# Patient Record
Sex: FEMALE | Race: WHITE | NOT HISPANIC OR LATINO | URBAN - METROPOLITAN AREA
[De-identification: names, ages, dates, MRNs, and addresses within clinical notes are randomized per-mention and may not be internally consistent; named-entity substitution may affect disease eponyms.]

---

## 2018-01-01 ENCOUNTER — INPATIENT (INPATIENT)
Facility: HOSPITAL | Age: 58
LOS: 0 days | End: 2018-03-12
Attending: INTERNAL MEDICINE | Admitting: INTERNAL MEDICINE
Payer: SELF-PAY

## 2018-01-01 VITALS
SYSTOLIC BLOOD PRESSURE: 131 MMHG | HEART RATE: 99 BPM | DIASTOLIC BLOOD PRESSURE: 106 MMHG | OXYGEN SATURATION: 97 % | RESPIRATION RATE: 21 BRPM | HEIGHT: 66 IN

## 2018-01-01 DIAGNOSIS — I62.9 NONTRAUMATIC INTRACRANIAL HEMORRHAGE, UNSPECIFIED: ICD-10-CM

## 2018-01-01 LAB
ALBUMIN SERPL ELPH-MCNC: 3 G/DL — LOW (ref 3.3–5)
ALBUMIN SERPL ELPH-MCNC: 3.1 G/DL — LOW (ref 3.3–5)
ALP SERPL-CCNC: 76 U/L — SIGNIFICANT CHANGE UP (ref 40–120)
ALP SERPL-CCNC: 79 U/L — SIGNIFICANT CHANGE UP (ref 40–120)
ALT FLD-CCNC: 67 U/L — SIGNIFICANT CHANGE UP (ref 12–78)
ALT FLD-CCNC: 82 U/L — HIGH (ref 12–78)
AMPHET UR-MCNC: NEGATIVE — SIGNIFICANT CHANGE UP
ANION GAP SERPL CALC-SCNC: 16 MMOL/L — SIGNIFICANT CHANGE UP (ref 5–17)
ANION GAP SERPL CALC-SCNC: 8 MMOL/L — SIGNIFICANT CHANGE UP (ref 5–17)
APTT BLD: 26.3 SEC — LOW (ref 27.5–37.4)
APTT BLD: 27.9 SEC — SIGNIFICANT CHANGE UP (ref 27.5–37.4)
AST SERPL-CCNC: 113 U/L — HIGH (ref 15–37)
AST SERPL-CCNC: 93 U/L — HIGH (ref 15–37)
BARBITURATES UR SCN-MCNC: NEGATIVE — SIGNIFICANT CHANGE UP
BASE EXCESS BLDA CALC-SCNC: -12.6 MMOL/L — LOW (ref -2–2)
BASE EXCESS BLDA CALC-SCNC: -6.4 MMOL/L — LOW (ref -2–2)
BASE EXCESS BLDA CALC-SCNC: -7.9 MMOL/L — LOW (ref -2–2)
BASOPHILS # BLD AUTO: 0.01 K/UL — SIGNIFICANT CHANGE UP (ref 0–0.2)
BASOPHILS # BLD AUTO: 0.04 K/UL — SIGNIFICANT CHANGE UP (ref 0–0.2)
BASOPHILS NFR BLD AUTO: 0.1 % — SIGNIFICANT CHANGE UP (ref 0–2)
BASOPHILS NFR BLD AUTO: 0.5 % — SIGNIFICANT CHANGE UP (ref 0–2)
BENZODIAZ UR-MCNC: NEGATIVE — SIGNIFICANT CHANGE UP
BILIRUB SERPL-MCNC: 0.2 MG/DL — SIGNIFICANT CHANGE UP (ref 0.2–1.2)
BILIRUB SERPL-MCNC: 0.3 MG/DL — SIGNIFICANT CHANGE UP (ref 0.2–1.2)
BLD GP AB SCN SERPL QL: SIGNIFICANT CHANGE UP
BLOOD GAS COMMENTS: SIGNIFICANT CHANGE UP
BLOOD GAS SOURCE: SIGNIFICANT CHANGE UP
BUN SERPL-MCNC: 19 MG/DL — SIGNIFICANT CHANGE UP (ref 7–23)
BUN SERPL-MCNC: 20 MG/DL — SIGNIFICANT CHANGE UP (ref 7–23)
CALCIUM SERPL-MCNC: 7.9 MG/DL — LOW (ref 8.5–10.1)
CALCIUM SERPL-MCNC: 8.3 MG/DL — LOW (ref 8.5–10.1)
CHLORIDE SERPL-SCNC: 109 MMOL/L — HIGH (ref 96–108)
CHLORIDE SERPL-SCNC: 122 MMOL/L — HIGH (ref 96–108)
CO2 SERPL-SCNC: 16 MMOL/L — LOW (ref 22–31)
CO2 SERPL-SCNC: 17 MMOL/L — LOW (ref 22–31)
COCAINE METAB.OTHER UR-MCNC: NEGATIVE — SIGNIFICANT CHANGE UP
CREAT SERPL-MCNC: 0.87 MG/DL — SIGNIFICANT CHANGE UP (ref 0.5–1.3)
CREAT SERPL-MCNC: 1.26 MG/DL — SIGNIFICANT CHANGE UP (ref 0.5–1.3)
EOSINOPHIL # BLD AUTO: 0.02 K/UL — SIGNIFICANT CHANGE UP (ref 0–0.5)
EOSINOPHIL # BLD AUTO: 0.13 K/UL — SIGNIFICANT CHANGE UP (ref 0–0.5)
EOSINOPHIL NFR BLD AUTO: 0.3 % — SIGNIFICANT CHANGE UP (ref 0–6)
EOSINOPHIL NFR BLD AUTO: 1.7 % — SIGNIFICANT CHANGE UP (ref 0–6)
GLUCOSE BLDC GLUCOMTR-MCNC: 111 MG/DL — HIGH (ref 70–99)
GLUCOSE BLDC GLUCOMTR-MCNC: 173 MG/DL — HIGH (ref 70–99)
GLUCOSE SERPL-MCNC: 244 MG/DL — HIGH (ref 70–99)
GLUCOSE SERPL-MCNC: 99 MG/DL — SIGNIFICANT CHANGE UP (ref 70–99)
HCO3 BLDA-SCNC: 14 MMOL/L — LOW (ref 21–29)
HCO3 BLDA-SCNC: 15 MMOL/L — LOW (ref 21–29)
HCO3 BLDA-SCNC: 17 MMOL/L — LOW (ref 21–29)
HCT VFR BLD CALC: 35.2 % — SIGNIFICANT CHANGE UP (ref 34.5–45)
HCT VFR BLD CALC: 36.1 % — SIGNIFICANT CHANGE UP (ref 34.5–45)
HGB BLD-MCNC: 11.8 G/DL — SIGNIFICANT CHANGE UP (ref 11.5–15.5)
HGB BLD-MCNC: 12.1 G/DL — SIGNIFICANT CHANGE UP (ref 11.5–15.5)
HOROWITZ INDEX BLDA+IHG-RTO: 100 — SIGNIFICANT CHANGE UP
HOROWITZ INDEX BLDA+IHG-RTO: 50 — SIGNIFICANT CHANGE UP
HOROWITZ INDEX BLDA+IHG-RTO: 50 — SIGNIFICANT CHANGE UP
IMM GRANULOCYTES NFR BLD AUTO: 0.4 % — SIGNIFICANT CHANGE UP (ref 0–1.5)
IMM GRANULOCYTES NFR BLD AUTO: 1.9 % — HIGH (ref 0–1.5)
INR BLD: 1.11 RATIO — SIGNIFICANT CHANGE UP (ref 0.88–1.16)
INR BLD: 1.12 RATIO — SIGNIFICANT CHANGE UP (ref 0.88–1.16)
LACTATE SERPL-SCNC: 1.8 MMOL/L — SIGNIFICANT CHANGE UP (ref 0.7–2)
LIDOCAIN IGE QN: 205 U/L — SIGNIFICANT CHANGE UP (ref 73–393)
LYMPHOCYTES # BLD AUTO: 1.03 K/UL — SIGNIFICANT CHANGE UP (ref 1–3.3)
LYMPHOCYTES # BLD AUTO: 15.4 % — SIGNIFICANT CHANGE UP (ref 13–44)
LYMPHOCYTES # BLD AUTO: 3.33 K/UL — HIGH (ref 1–3.3)
LYMPHOCYTES # BLD AUTO: 44.2 % — HIGH (ref 13–44)
MAGNESIUM SERPL-MCNC: 2.2 MG/DL — SIGNIFICANT CHANGE UP (ref 1.6–2.6)
MCHC RBC-ENTMCNC: 29.6 PG — SIGNIFICANT CHANGE UP (ref 27–34)
MCHC RBC-ENTMCNC: 29.7 PG — SIGNIFICANT CHANGE UP (ref 27–34)
MCHC RBC-ENTMCNC: 32.7 GM/DL — SIGNIFICANT CHANGE UP (ref 32–36)
MCHC RBC-ENTMCNC: 34.4 GM/DL — SIGNIFICANT CHANGE UP (ref 32–36)
MCV RBC AUTO: 86.3 FL — SIGNIFICANT CHANGE UP (ref 80–100)
MCV RBC AUTO: 90.7 FL — SIGNIFICANT CHANGE UP (ref 80–100)
METHADONE UR-MCNC: NEGATIVE — SIGNIFICANT CHANGE UP
MONOCYTES # BLD AUTO: 0.21 K/UL — SIGNIFICANT CHANGE UP (ref 0–0.9)
MONOCYTES # BLD AUTO: 0.37 K/UL — SIGNIFICANT CHANGE UP (ref 0–0.9)
MONOCYTES NFR BLD AUTO: 3.1 % — SIGNIFICANT CHANGE UP (ref 2–14)
MONOCYTES NFR BLD AUTO: 4.9 % — SIGNIFICANT CHANGE UP (ref 2–14)
NEUTROPHILS # BLD AUTO: 3.53 K/UL — SIGNIFICANT CHANGE UP (ref 1.8–7.4)
NEUTROPHILS # BLD AUTO: 5.39 K/UL — SIGNIFICANT CHANGE UP (ref 1.8–7.4)
NEUTROPHILS NFR BLD AUTO: 46.8 % — SIGNIFICANT CHANGE UP (ref 43–77)
NEUTROPHILS NFR BLD AUTO: 80.7 % — HIGH (ref 43–77)
NRBC # BLD: 0 /100 WBCS — SIGNIFICANT CHANGE UP (ref 0–0)
NRBC # BLD: 0 /100 WBCS — SIGNIFICANT CHANGE UP (ref 0–0)
OPIATES UR-MCNC: NEGATIVE — SIGNIFICANT CHANGE UP
PCO2 BLDA: 19 MMHG — LOW (ref 32–46)
PCO2 BLDA: 34 MMHG — SIGNIFICANT CHANGE UP (ref 32–46)
PCO2 BLDA: 39 MMHG — SIGNIFICANT CHANGE UP (ref 32–46)
PCP SPEC-MCNC: SIGNIFICANT CHANGE UP
PCP UR-MCNC: NEGATIVE — SIGNIFICANT CHANGE UP
PH BLD: 7.19 — CRITICAL LOW (ref 7.35–7.45)
PH BLD: 7.32 — LOW (ref 7.35–7.45)
PH BLD: 7.49 — HIGH (ref 7.35–7.45)
PHOSPHATE SERPL-MCNC: 1.1 MG/DL — LOW (ref 2.5–4.5)
PLATELET # BLD AUTO: 221 K/UL — SIGNIFICANT CHANGE UP (ref 150–400)
PLATELET # BLD AUTO: 269 K/UL — SIGNIFICANT CHANGE UP (ref 150–400)
PO2 BLDA: 123 MMHG — HIGH (ref 74–108)
PO2 BLDA: 291 MMHG — HIGH (ref 74–108)
PO2 BLDA: 84 MMHG — SIGNIFICANT CHANGE UP (ref 74–108)
POTASSIUM SERPL-MCNC: 3.5 MMOL/L — SIGNIFICANT CHANGE UP (ref 3.5–5.3)
POTASSIUM SERPL-MCNC: 3.7 MMOL/L — SIGNIFICANT CHANGE UP (ref 3.5–5.3)
POTASSIUM SERPL-SCNC: 3.5 MMOL/L — SIGNIFICANT CHANGE UP (ref 3.5–5.3)
POTASSIUM SERPL-SCNC: 3.7 MMOL/L — SIGNIFICANT CHANGE UP (ref 3.5–5.3)
PROT SERPL-MCNC: 6.4 GM/DL — SIGNIFICANT CHANGE UP (ref 6–8.3)
PROT SERPL-MCNC: 6.6 GM/DL — SIGNIFICANT CHANGE UP (ref 6–8.3)
PROTHROM AB SERPL-ACNC: 12.1 SEC — SIGNIFICANT CHANGE UP (ref 9.8–12.7)
PROTHROM AB SERPL-ACNC: 12.2 SEC — SIGNIFICANT CHANGE UP (ref 9.8–12.7)
RBC # BLD: 3.98 M/UL — SIGNIFICANT CHANGE UP (ref 3.8–5.2)
RBC # BLD: 4.08 M/UL — SIGNIFICANT CHANGE UP (ref 3.8–5.2)
RBC # FLD: 13 % — SIGNIFICANT CHANGE UP (ref 10.3–14.5)
RBC # FLD: 13.2 % — SIGNIFICANT CHANGE UP (ref 10.3–14.5)
SAO2 % BLDA: 96 % — SIGNIFICANT CHANGE UP (ref 92–96)
SAO2 % BLDA: 98 % — HIGH (ref 92–96)
SAO2 % BLDA: 99 % — HIGH (ref 92–96)
SODIUM SERPL-SCNC: 141 MMOL/L — SIGNIFICANT CHANGE UP (ref 135–145)
SODIUM SERPL-SCNC: 147 MMOL/L — HIGH (ref 135–145)
THC UR QL: NEGATIVE — SIGNIFICANT CHANGE UP
TROPONIN I SERPL-MCNC: 0.04 NG/ML — SIGNIFICANT CHANGE UP (ref 0.01–0.04)
WBC # BLD: 6.69 K/UL — SIGNIFICANT CHANGE UP (ref 3.8–10.5)
WBC # BLD: 7.54 K/UL — SIGNIFICANT CHANGE UP (ref 3.8–10.5)
WBC # FLD AUTO: 6.69 K/UL — SIGNIFICANT CHANGE UP (ref 3.8–10.5)
WBC # FLD AUTO: 7.54 K/UL — SIGNIFICANT CHANGE UP (ref 3.8–10.5)

## 2018-01-01 PROCEDURE — 70450 CT HEAD/BRAIN W/O DYE: CPT | Mod: 26

## 2018-01-01 PROCEDURE — 99497 ADVNCD CARE PLAN 30 MIN: CPT

## 2018-01-01 PROCEDURE — 99291 CRITICAL CARE FIRST HOUR: CPT

## 2018-01-01 PROCEDURE — 71045 X-RAY EXAM CHEST 1 VIEW: CPT | Mod: 26

## 2018-01-01 RX ORDER — MANNITOL
80 POWDER (GRAM) MISCELLANEOUS ONCE
Qty: 0 | Refills: 0 | Status: DISCONTINUED | OUTPATIENT
Start: 2018-01-01 | End: 2018-01-01

## 2018-01-01 RX ORDER — POTASSIUM PHOSPHATE, MONOBASIC POTASSIUM PHOSPHATE, DIBASIC 236; 224 MG/ML; MG/ML
30 INJECTION, SOLUTION INTRAVENOUS ONCE
Qty: 0 | Refills: 0 | Status: COMPLETED | OUTPATIENT
Start: 2018-01-01 | End: 2018-01-01

## 2018-01-01 RX ORDER — PANTOPRAZOLE SODIUM 20 MG/1
40 TABLET, DELAYED RELEASE ORAL DAILY
Qty: 0 | Refills: 0 | Status: DISCONTINUED | OUTPATIENT
Start: 2018-01-01 | End: 2018-01-01

## 2018-01-01 RX ORDER — MANNITOL
80 POWDER (GRAM) MISCELLANEOUS ONCE
Qty: 0 | Refills: 0 | Status: COMPLETED | OUTPATIENT
Start: 2018-01-01 | End: 2018-01-01

## 2018-01-01 RX ORDER — NOREPINEPHRINE BITARTRATE/D5W 8 MG/250ML
0.05 PLASTIC BAG, INJECTION (ML) INTRAVENOUS
Qty: 8 | Refills: 0 | Status: DISCONTINUED | OUTPATIENT
Start: 2018-01-01 | End: 2018-01-01

## 2018-01-01 RX ORDER — LEVETIRACETAM 250 MG/1
1000 TABLET, FILM COATED ORAL ONCE
Qty: 0 | Refills: 0 | Status: COMPLETED | OUTPATIENT
Start: 2018-01-01 | End: 2018-01-01

## 2018-01-01 RX ORDER — MORPHINE SULFATE 50 MG/1
2 CAPSULE, EXTENDED RELEASE ORAL
Qty: 0 | Refills: 0 | Status: DISCONTINUED | OUTPATIENT
Start: 2018-01-01 | End: 2018-01-01

## 2018-01-01 RX ORDER — CHLORHEXIDINE GLUCONATE 213 G/1000ML
15 SOLUTION TOPICAL
Qty: 0 | Refills: 0 | Status: DISCONTINUED | OUTPATIENT
Start: 2018-01-01 | End: 2018-01-01

## 2018-01-01 RX ORDER — NOREPINEPHRINE BITARTRATE/D5W 8 MG/250ML
0.02 PLASTIC BAG, INJECTION (ML) INTRAVENOUS
Qty: 8 | Refills: 0 | Status: DISCONTINUED | OUTPATIENT
Start: 2018-01-01 | End: 2018-01-01

## 2018-01-01 RX ORDER — CHLORHEXIDINE GLUCONATE 213 G/1000ML
1 SOLUTION TOPICAL DAILY
Qty: 0 | Refills: 0 | Status: DISCONTINUED | OUTPATIENT
Start: 2018-01-01 | End: 2018-01-01

## 2018-01-01 RX ADMIN — LEVETIRACETAM 400 MILLIGRAM(S): 250 TABLET, FILM COATED ORAL at 05:08

## 2018-01-01 RX ADMIN — Medication 7.5 MICROGRAM(S)/KG/MIN: at 22:22

## 2018-01-01 RX ADMIN — POTASSIUM PHOSPHATE, MONOBASIC POTASSIUM PHOSPHATE, DIBASIC 85 MILLIMOLE(S): 236; 224 INJECTION, SOLUTION INTRAVENOUS at 08:45

## 2018-01-01 RX ADMIN — CHLORHEXIDINE GLUCONATE 15 MILLILITER(S): 213 SOLUTION TOPICAL at 05:08

## 2018-01-01 RX ADMIN — MORPHINE SULFATE 2 MILLIGRAM(S): 50 CAPSULE, EXTENDED RELEASE ORAL at 14:50

## 2018-01-01 RX ADMIN — Medication 3 MICROGRAM(S)/KG/MIN: at 05:08

## 2018-01-01 RX ADMIN — Medication 400 GRAM(S): at 23:57

## 2018-01-01 RX ADMIN — CHLORHEXIDINE GLUCONATE 1 APPLICATION(S): 213 SOLUTION TOPICAL at 12:37

## 2018-01-01 RX ADMIN — MORPHINE SULFATE 2 MILLIGRAM(S): 50 CAPSULE, EXTENDED RELEASE ORAL at 14:35

## 2018-01-01 RX ADMIN — PANTOPRAZOLE SODIUM 40 MILLIGRAM(S): 20 TABLET, DELAYED RELEASE ORAL at 12:37

## 2018-03-11 NOTE — ED PROVIDER NOTE - PHYSICAL EXAMINATION
Gen: no signs of trauma  HEENT: Pupils 3mm, unreactive to light bilaterally; intubated  neck; no step offs;   CV: rrr; no m/g/r  lungs; equal breath sounds bilaterally;   abd; non distended  back; no trauma  ext; no edema;   neuro: no gag despite intubation; no spontaneous movements; no pupil movements; no spontaneous respirations; unresponsive to noxious stimuli

## 2018-03-11 NOTE — ED ADULT TRIAGE NOTE - CHIEF COMPLAINT QUOTE
As per EMS arrived tos scene As per EMS was eating, then noted vomiting, became unresponsive. States BLS arrived and pt had a faint pulse, then she went asystole. 1epi given via IO, intubated 7.0 and IO to right leg by EMS.

## 2018-03-11 NOTE — ED PROVIDER NOTE - MEDICAL DECISION MAKING DETAILS
A/P: s/p cardiac arrest 2/2 large intracranial hemorrhage with herniation; case discussed with neurosurgery (Dr. Sams) who reports given patient extensive hemorrhage, s/p cardiac arrest, unresponsiveness, her prognosis is extremely poor and refusing transfer because there is no neurosurgical intervention.  patient started on mannitol, Dr. lea (neurology consulted), will admit to ICU here. extensive discussion with family about patient's condition and prognosis and all questions answered

## 2018-03-11 NOTE — ED ADULT NURSE NOTE - OBJECTIVE STATEMENT
58 y/o female PMHx anxiety biba s/p cardiac arrest, as per ems patient was eating diiner at home and syncopized, HR 20 followed by asystole. 1 epinepherine given by ems. 7 et tube, 20 at lip line

## 2018-03-11 NOTE — ED ADULT NURSE NOTE - CHIEF COMPLAINT QUOTE
As per EMS was eating, then noted vomiting, became unresponsive. States BLS arrived and pt had a faint pulse, then she went asystole. 1epi given via IO, intubated 7.0 and IO to right leg by EMS.

## 2018-03-11 NOTE — ED PROVIDER NOTE - OBJECTIVE STATEMENT
Pertinent PMH/PSH/FHx/SHx and Review of Systems contained within:    58 y/o F w no known history presents after cardiac arrest; pt's boyfriend reports she was eating ice cream, soon afterwards had witnessed episode unresponsiveness; on EMS arrival, patient lost pulses, initial rhythm asystole, had ROSC after about 5 minutes of acls interventions; pt intubated in the field;   on ed arrival patient's w spontaneous cardiac activity, initial ekg significant for 1mm st elevation in avr and ST depressions v2-v6; discussed w cardiology (Dr. Callahan), initial plan to admit to CCU; patient's BP dropped to 60s/40s, started on low dose norepinephrine with improvement of bp;   pt subsequently had CT head significant for large L intracranial hemorrhage with intraventricular extension and herniation    PMH: none  meds; none  allergies; nkda  SH: no cig or drug use per family  FH: mother and father w MI

## 2018-03-12 NOTE — H&P ADULT - NSHPLABSRESULTS_GEN_ALL_CORE
EXAM:  CT BRAIN                            PROCEDURE DATE:  03/11/2018          INTERPRETATION:    Clinical Indication: Status post cardiac arrest    Comparison: None.        Technique: Noncontrast axial CT images of the head it were acquired.    Findings: There is a large intraparenchymal hemorrhage in the left   frontal lobe with subfalcine herniation. The midline shift up to 11 mm at   the level of frontal horns. There is subarachnoid hemorrhage in the   suprasellar cistern. Small amount of intraventricular hemorrhage is seen   in the lateral ventricles. There is complete effacement of the basal   cisterns with midline shift concerning for uncal herniation.. There is   near complete effacement of the cortical sulci indicating diffuse brain   edema. Gray-white differentiation is maintained. There is a small amount   of fluid collection in the both maxillary, ethmoid and sphenoid sinuses.   Visualized mastoid air cells are clear. There are flecks of air in the   nasopharynx (2-1). No osseous abnormality is seen.    Impression: Extensive left-sided intramural parenchymal hemorrhage,   subarachnoid hemorrhage and intraventricular hemorrhage with subfalcine   and uncal herniation.    Diffuse cerebral edema.    Flecks of air in the nasopharynx of unknown significance.    Findings were discussed with Dr. Hurley with read back at 11:07 PM.                CHARO CLARKE M.D., ATTENDING RADIOLOGIST  This document has been electronically signed. Mar 11 2018 11:36PM

## 2018-03-12 NOTE — H&P ADULT - ATTENDING COMMENTS
pt seen and examined at the bedside. pt with massive ICH, no pupillary reflex , no corneals, no gag, doesn't trigger the vent on my exam this am. condition d/w the sister and niece who are the decision maker as pt doesn't have any other family. they are leaning towards comfort care. will continue supportive treatment until family is ready.

## 2018-03-12 NOTE — CONSULT NOTE ADULT - ASSESSMENT
Historian:     Case d/w Dr. Hurley.  ER notes reviewed.  Case d/w Neurosurgery as per Dr. Hurley.    HPI:    GABINO JOHNSON  58 Y/O female w/ no known significant PMHx admitted s/p cardiac arrest. As per sister, pt was out eating w/ significant other when she suddenly became unresponsive. Upon EMS arrival, pt was pulseless, asystolic, ACLS performed and ROSC achieved after 5 min as per ED. Upon admission head CT determined presence of significant large L intracranial hemorrhage with intraventricular extension and herniation.  ED reports Neurosurgery (Dr. Sams) called who states that given patient extensive hemorrhage, s/p cardiac arrest, unresponsiveness, her prognosis is extremely poor and refusing transfer because there is no neurosurgical intervention. ICU called for further evaluation. (12 Mar 2018 04:03)      PAST MEDICAL & SURGICAL HISTORY:  57yFemale    MEDICATIONS  (STANDING):  chlorhexidine 0.12% Liquid 15 milliLiter(s) Swish and Spit two times a day  norepinephrine Infusion 0.02 MICROgram(s)/kG/Min (3 mL/Hr) IV Continuous <Continuous>  pantoprazole  Injectable 40 milliGRAM(s) IV Push daily  potassium phosphate IVPB 30 milliMole(s) IV Intermittent once    MEDICATIONS  (PRN):      Allergies    Allergy Status Unknown    Intolerances      FAMILY HISTORY:    Vital Signs Last 24 Hrs  T(C): 33.1 (12 Mar 2018 04:00), Max: 34.8 (11 Mar 2018 22:13)  T(F): 91.6 (12 Mar 2018 04:00), Max: 94.7 (11 Mar 2018 22:13)  HR: 75 (12 Mar 2018 08:00) (72 - 138)  BP: 98/72 (12 Mar 2018 08:00) (77/54 - 189/110)  BP(mean): 81 (12 Mar 2018 08:00) (61 - 105)  RR: 22 (12 Mar 2018 08:00) (15 - 22)  SpO2: 100% (12 Mar 2018 08:00) (95% - 100%)    NEUROLOGICAL EXAM:  To follow  HENT:  Normocephalic head; atraumatic head.  Neck supple.  ENT: normal looking.  Mental State:    Alert.  Fully oriented to person, place and date.  Coherent.  Speech clear and intact.  Cooperative.  Responds appropriately.    Cranial Nerves:  II-XII:   Pupils round and reactive to light and accommodation.  Extraocular movements full.  Visual fields full (no homonymous hemianopsia).  Visual acuity wnl.  Facial symmetry intact.  Tongue midline.  Motor Functions:  Moves all extremities.  No pronator drift of UE.  Claps hand well.  Hand  intact bilaterally.    Sensory Functions:   Intact to touch and pinprick to face and extremities.    Reflexes:  Deep tendon reflexes normoactive to biceps, knees and ankles.  Babinski absent (present).  Cerebellar Testing:    Finger to nose intact.  Nystagmus absent.  Neurovascular: Carotid auscultation full without bruits.      LABS:                        12.1   6.69  )-----------( 221      ( 12 Mar 2018 03:41 )             35.2     03-12    147<H>  |  122<H>  |  19  ----------------------------<  99  3.7   |  17<L>  |  0.87    Ca    7.9<L>      12 Mar 2018 03:41  Phos  1.1     03-12  Mg     2.2     03-12    TPro  6.4  /  Alb  3.1<L>  /  TBili  0.3  /  DBili  x   /  AST  113<H>  /  ALT  82<H>  /  AlkPhos  76  03-12    PT/INR - ( 12 Mar 2018 03:41 )   PT: 12.2 sec;   INR: 1.12 ratio         PTT - ( 12 Mar 2018 03:41 )  PTT:26.3 sec        RADIOLOGY & ADDITIONAL STUDIES:    POCT  Blood Glucose: 21:35 (03-11 @ 21:37)  Complete Blood Count + Automated Diff: STAT (03-11 @ 21:44)  Comprehensive Metabolic Panel: STAT (03-11 @ 21:44)  Lipase, Serum: STAT (03-11 @ 21:44)  Activated Partial Thromboplastin Time:  Start Date:11-Mar-2018. STAT (03-11 @ 21:44)  Prothrombin Time and INR, Plasma:  Start Date:11-Mar-2018. STAT (03-11 @ 21:44)  Troponin I, Serum: STAT (03-11 @ 21:44)  Type + Screen: Routine  Cancel Reason: Hemolyzed Redraw (03-11 @ 21:44)  ABO Rh Confirmatory Specimen: Routine  Addl Info: Conditional: ABO Rh Confirmatory Specimen (03-11 @ 21:44)  IV Insert:     Time/Priority:  STAT (03-11 @ 21:44)  Cardiac Monitor Bedside:     Time/Priority:  STAT (03-11 @ 21:44)  Xray Chest 1 View- PORTABLE-Urgent: Urgent   Indication: post cardiac arrest  Transport: Portable  Exam Completed  Provider's Contact #: [phone] (03-11 @ 21:44)  Nucleated RBC: 21:45 (03-11 @ 21:52)  Antibody Screen: 21:45  Cancel Reason: Hemolyzed Redraw (03-11 @ 21:52)  Mechanical Vent - PRVC Initial Setup: Routine  once (03-11 @ 21:56)  Mechanical Vent - PRVC Settings: Routine  <Continuous>  Ventilator Mode:  AC/CMV   Tidal Volume:  450  Respiratory Rate:  22   PEEP:  5   FiO2:  100  Additional Instructions:  May titrate the FIO2 to maintain the sat 95% (03-11 @ 21:56)  End Tidal CO2:   Frequency: Once  Modality: Ventilator Reason: S/P Cardiac Arrest (03-11 @ 22:00)  Standby Therapist Each 15 Minutes: Once (03-11 @ 22:01)  CT Head No Cont: Urgent   Indication: s/p cardiac arrest  Transport: Stretcher-Crib  Exam Completed  Provider's Contact #: [phone] (03-11 @ 22:01)  Blood Gas Profile - Arterial: STAT (03-11 @ 22:04)  CT Angio Chest w/ IV Cont: Urgent   Indication: r/o PE  Transport: Stretcher-Crib  Provider's Contact #: [phone] (03-11 @ 22:07)  norepinephrine Infusion: [Known as LEVOPHED]  8 milliGRAM(s) in sodium chloride 0.9% 250 milliLiter(s), infuse at 7.5 mL/Hr  Dose Rate: 0.05 MICROgram(s)/kG/Min  Administration Instructions: Peripheral/Central Line use  Provider's Contact #: [phone] (03-11 @ 22:16)  Indwelling Urethral Catheter:     Connect To:  Straight Drainage/Gravity    Indication:  Urine Output Monitoring in Critically Ill (03-11 @ 22:18)  Lactate, Blood: STAT (03-11 @ 22:26)  ABG Puncture - Respiratory: Once (03-11 @ 22:26)  mannitol 25% IVPB:   80 Gram(s), IV Intermittent, Once, infuse over 30 Minute(s), Stop After 1 Doses  Provider's Contact #: [phone] (03-11 @ 23:08)  Intra Hospital Transport: Once (03-11 @ 23:09)  Intra Hospital Transport: Once (03-11 @ 23:09)  ABG Puncture - Respiratory: Once (03-11 @ 23:30)  mannitol 20% IVPB:   80 Gram(s) in IV Solution 400 milliLiter(s), IV Intermittent, once, infuse over 60 Minute(s), Stop After 1 Doses  Indication: intracranial pressure  Administration Instructions: Concentration: 0.2 Gram(s)/milliLiter(s)  Provider's Contact #: [phone] (03-11 @ 23:46)  Type + Screen: Routine (03-11 @ 23:46)  ABO Rh Confirmatory Specimen: Routine  Addl Info: Conditional: ABO Rh Confirmatory Specimen (03-11 @ 23:46)  Admit to Inpatient Level of Care:     Service:  INTENSIVE CARE UNIT    Physician:  Karyn Castañeda    Additional Instructions:  Diagnosis: Intracranial hemorrhage  Isolation: None  Special Consideration: None (03-12 @ 00:10)  Antibody Screen: 00:02 (03-12 @ 00:13)  Admit from ED (03-12 @ 00:18)  levETIRAcetam  IVPB: [Ordered as KEPPRA IVPB]  1000 milliGRAM(s) in IV Solution 100 milliLiter(s), IV Intermittent, once, infuse over 15 Minute(s), Stop After 1 Doses  Administration Instructions: This is a Look-alike/Sound-alike Medication  Provider's Contact #: [phone] (03-12 @ 00:46)  Intermittent Pneumatic Compression:     Body Side:  Bilateral    Additional Instructions:  Apply device now and remove only for bathing and skin evaluation as per nursing protocol. (03-12 @ 00:52)  NO Sedation:     Frequency:  daily    Additional Instructions:  Daily Sedation Holiday (03-12 @ 00:52)  Height/Length:     Frequency:  on admission (03-12 @ 00:52)  Weight:     Frequency:  on admission (03-12 @ 00:52)  Weight:     Frequency:  daily (03-12 @ 00:52)  Vital Signs:     Frequency:  every 1 hour hour(s) hour(s)    Additional Instructions:  Assess BP, HR, RR, Temp. and SaO2 (03-12 @ 00:52)  Assess Pain:     Additional Instructions:  Per ICU protocol (03-12 @ 00:52)  Assess Neurological Status:     Type of Neuro Check:  General    Frequency:  every 4 hours (03-12 @ 00:52)  Notify Provider For:     Additional Instructions:  Decline or change in neurological status (03-12 @ 00:52)  Activity - May Dangle at Bedside:     Time/Priority:  Routine (03-12 @ 00:52)  Activity - Increase As Tolerated:     Time/Priority:  Routine (03-12 @ 00:52)  pantoprazole  Injectable: [Known as PROTONIX  Injectable]  40 milliGRAM(s), IV Push, daily  Provider's Contact #: [phone] (03-12 @ 00:52)  Admit to Inpatient Level of Care:     Service:  ICU    Physician:  Garry (03-12 @ 00:53)  Complete Blood Count + Automated Diff: AM Sched. Collection: 12-Mar-2018 05:00 (03-12 @ 00:54)  Comprehensive Metabolic Panel: AM Sched. Collection: 12-Mar-2018 05:00 (03-12 @ 00:54)  Magnesium, Serum: AM Sched. Collection: 12-Mar-2018 05:00 (03-12 @ 00:54)  Phosphorus Level, Serum: AM Sched. Collection: 12-Mar-2018 05:00 (03-12 @ 00:54)  Activated Partial Thromboplastin Time:  Start Date:12-Mar-2018. AM Sched. Collection:12-Mar-2018 05:00 (03-12 @ 00:54)  Prothrombin Time and INR, Plasma:  Start Date:12-Mar-2018. AM Sched. Collection:12-Mar-2018 05:00 (03-12 @ 00:54)  Suction Endotracheal: <Continuous> (03-12 @ 00:55)  Spontaneous Breathing Trials:   Frequency <Continuous>  Methods: Pressure Support  Additional Instructions:  5/5 (03-12 @ 00:55)  chlorhexidine 0.12% Liquid: [Known as Peridex]  15 milliLiter(s), Swish and Spit, two times a day  Indication: intubation  Administration Instructions: Rinse for 30 seconds then expectorate.  Dispose unused medication in BLACK bin.  Provider's Contact #: [phone] (03-12 @ 00:55)  POCT  Blood Glucose: 01:05 (03-12 @ 01:06)  Blood Gas Profile - Arterial: STAT (03-12 @ 01:39)  Intra Hospital Transport (03-12 @ 03:04)  Nucleated RBC: 03:15 (03-12 @ 03:41)  norepinephrine Infusion: [Ordered as LEVOPHED]  8 milliGRAM(s) in sodium chloride 0.9% 250 milliLiter(s), infuse at 3 mL/Hr  Dose Rate: 0.02 MICROgram(s)/kG/Min  Administration Instructions: Peripheral/Central Line use  Special Instructions: titrate for MAP > or = to 65 as per protocol  Provider's Contact #: [phone] (03-12 @ 03:59)  potassium phosphate IVPB:   30 milliMole(s) in sodium chloride 0.9% 500 milliLiter(s), IV Intermittent, once, infuse over 6 Hour(s), Stop After 1 Doses  Provider's Contact #: [phone] (03-12 @ 05:53)  Blood Gas Profile - Arterial: 08:00 (03-12 @ 07:20)    Assessment & Opinion:  Intracerebral Hemorrhage.  Coma. Severe Encephalopathy    Recommendations:  Brain MRI.  Carotid doppler.  Echocardiogram.  EEG.   DVT prophylaxis as ordered.  Medications: Case d/w Dr. Hurley.  ER notes reviewed.  Case d/w Neurosurgery as per Dr. Hurley.  HPI:    GABINO JOHNSON.  58 Y/O female w/ no known significant PMHx admitted s/p cardiac arrest. As per sister, pt was out eating w/ significant other when she suddenly became unresponsive. Upon EMS arrival, pt was pulseless, asystolic, ACLS performed and ROSC achieved after 5 min as per ED. Upon admission head CT determined presence of significant large L intracranial hemorrhage with intraventricular extension and herniation.  ED reports Neurosurgery (Dr. Sams) called who states that given patient extensive hemorrhage, s/p cardiac arrest, unresponsiveness, her prognosis is extremely poor and refusing transfer because there is no neurosurgical intervention. ICU called for further evaluation. (12 Mar 2018 04:03)    PAST MEDICAL & SURGICAL HISTORY:    MEDICATIONS  (STANDING):  chlorhexidine 0.12% Liquid 15 milliLiter(s) Swish and Spit two times a day  norepinephrine Infusion 0.02 MICROgram(s)/kG/Min (3 mL/Hr) IV Continuous <Continuous>  pantoprazole  Injectable 40 milliGRAM(s) IV Push daily  potassium phosphate IVPB 30 milliMole(s) IV Intermittent once    MEDICATIONS  (PRN):  Allergies: Allergy Status Unknown  Intolerances  FAMILY HISTORY:    Vital Signs Last 24 Hrs  T(C): 33.1 (12 Mar 2018 04:00), Max: 34.8 (11 Mar 2018 22:13)  T(F): 91.6 (12 Mar 2018 04:00), Max: 94.7 (11 Mar 2018 22:13)  HR: 75 (12 Mar 2018 08:00) (72 - 138)  BP: 98/72 (12 Mar 2018 08:00) (77/54 - 189/110)  BP(mean): 81 (12 Mar 2018 08:00) (61 - 105)  RR: 22 (12 Mar 2018 08:00) (15 - 22)  SpO2: 100% (12 Mar 2018 08:00) (95% - 100%)    NEUROLOGICAL EXAM:   Presently intubated (seen in ICU)  HENT:  Normocephalic head; atraumatic head.  Neck supple.    Mental State:   Deeply comatose.      Cranial Nerves:  Pupils round and slightly reactive to light.   Absent gag; corneals; absent oculocephalics.   Motor Functions:  Totally atonic.      Sensory Functions:   Absent to noxious stimulation.     Reflexes:  Absent superficial and Deep tendon reflexes.  Cerebellar Testing:    Unable to test.    Neurovascular: Carotid auscultation full without bruits.      LABS:                        12.1   6.69  )-----------( 221      ( 12 Mar 2018 03:41 )             35.2     03-12    147<H>  |  122<H>  |  19  ----------------------------<  99  3.7   |  17<L>  |  0.87    Ca    7.9<L>      12 Mar 2018 03:41  Phos  1.1     03-12  Mg     2.2     03-12    TPro  6.4  /  Alb  3.1<L>  /  TBili  0.3  /  DBili  x   /  AST  113<H>  /  ALT  82<H>  /  AlkPhos  76  03-12    PT/INR - ( 12 Mar 2018 03:41 )   PT: 12.2 sec;   INR: 1.12 ratio         PTT - ( 12 Mar 2018 03:41 )  PTT:26.3 sec    RADIOLOGY & ADDITIONAL STUDIES:    POCT  Blood Glucose: 21:35 (03-11 @ 21:37)  Complete Blood Count + Automated Diff: STAT (03-11 @ 21:44)  Comprehensive Metabolic Panel: STAT (03-11 @ 21:44)  Lipase, Serum: STAT (03-11 @ 21:44)  Activated Partial Thromboplastin Time:  Start Date:11-Mar-2018. STAT (03-11 @ 21:44)  Prothrombin Time and INR, Plasma:  Start Date:11-Mar-2018. STAT (03-11 @ 21:44)  Troponin I, Serum: STAT (03-11 @ 21:44)  Type + Screen: Routine  Cancel Reason: Hemolyzed Redraw (03-11 @ 21:44)  ABO Rh Confirmatory Specimen: Routine  Addl Info: Conditional: ABO Rh Confirmatory Specimen (03-11 @ 21:44)  IV Insert:     Time/Priority:  STAT (03-11 @ 21:44)  Cardiac Monitor Bedside:     Time/Priority:  STAT (03-11 @ 21:44)  Xray Chest 1 View- PORTABLE-Urgent: Urgent   Indication: post cardiac arrest  Transport: Portable  Exam Completed  Provider's Contact #: [phone] (03-11 @ 21:44)  Nucleated RBC: 21:45 (03-11 @ 21:52)  Antibody Screen: 21:45  Cancel Reason: Hemolyzed Redraw (03-11 @ 21:52)  Mechanical Vent - PRVC Initial Setup: Routine  once (03-11 @ 21:56)  Mechanical Vent - PRVC Settings: Routine  <Continuous>  Ventilator Mode:  AC/CMV   Tidal Volume:  450  Respiratory Rate:  22   PEEP:  5   FiO2:  100  Additional Instructions:  May titrate the FIO2 to maintain the sat 95% (03-11 @ 21:56)  End Tidal CO2:   Frequency: Once  Modality: Ventilator Reason: S/P Cardiac Arrest (03-11 @ 22:00)  Standby Therapist Each 15 Minutes: Once (03-11 @ 22:01)  CT Head No Cont: Urgent   Indication: s/p cardiac arrest  Transport: Stretcher-Crib  Exam Completed  Provider's Contact #: [phone] (03-11 @ 22:01)  Blood Gas Profile - Arterial: STAT (03-11 @ 22:04)  CT Angio Chest w/ IV Cont: Urgent   Indication: r/o PE  Transport: Stretcher-Crib  Provider's Contact #: [phone] (03-11 @ 22:07)  norepinephrine Infusion: [Known as LEVOPHED]  8 milliGRAM(s) in sodium chloride 0.9% 250 milliLiter(s), infuse at 7.5 mL/Hr  Dose Rate: 0.05 MICROgram(s)/kG/Min  Administration Instructions: Peripheral/Central Line use  Provider's Contact #: [phone] (03-11 @ 22:16)  Indwelling Urethral Catheter:     Connect To:  Straight Drainage/Gravity    Indication:  Urine Output Monitoring in Critically Ill (03-11 @ 22:18)  Lactate, Blood: STAT (03-11 @ 22:26)  ABG Puncture - Respiratory: Once (03-11 @ 22:26)  mannitol 25% IVPB:   80 Gram(s), IV Intermittent, Once, infuse over 30 Minute(s), Stop After 1 Doses  Provider's Contact #: [phone] (03-11 @ 23:08)  Intra Hospital Transport: Once (03-11 @ 23:09)  Intra Hospital Transport: Once (03-11 @ 23:09)  ABG Puncture - Respiratory: Once (03-11 @ 23:30)  mannitol 20% IVPB:   80 Gram(s) in IV Solution 400 milliLiter(s), IV Intermittent, once, infuse over 60 Minute(s), Stop After 1 Doses  Indication: intracranial pressure  Administration Instructions: Concentration: 0.2 Gram(s)/milliLiter(s)  Provider's Contact #: [phone] (03-11 @ 23:46)  Type + Screen: Routine (03-11 @ 23:46)  ABO Rh Confirmatory Specimen: Routine  Addl Info: Conditional: ABO Rh Confirmatory Specimen (03-11 @ 23:46)  Admit to Inpatient Level of Care:     Service:  INTENSIVE CARE UNIT    Physician:  Karyn Castañeda    Additional Instructions:  Diagnosis: Intracranial hemorrhage  Isolation: None  Special Consideration: None (03-12 @ 00:10)  Antibody Screen: 00:02 (03-12 @ 00:13)  Admit from ED (03-12 @ 00:18)  levETIRAcetam  IVPB: [Ordered as KEPPRA IVPB]  1000 milliGRAM(s) in IV Solution 100 milliLiter(s), IV Intermittent, once, infuse over 15 Minute(s), Stop After 1 Doses  Administration Instructions: This is a Look-alike/Sound-alike Medication  Provider's Contact #: [phone] (03-12 @ 00:46)  Intermittent Pneumatic Compression:     Body Side:  Bilateral    Additional Instructions:  Apply device now and remove only for bathing and skin evaluation as per nursing protocol. (03-12 @ 00:52)  NO Sedation:     Frequency:  daily    Additional Instructions:  Daily Sedation Holiday (03-12 @ 00:52)  Height/Length:     Frequency:  on admission (03-12 @ 00:52)  Weight:     Frequency:  on admission (03-12 @ 00:52)  Weight:     Frequency:  daily (03-12 @ 00:52)  Vital Signs:     Frequency:  every 1 hour hour(s) hour(s)    Additional Instructions:  Assess BP, HR, RR, Temp. and SaO2 (03-12 @ 00:52)  Assess Pain:     Additional Instructions:  Per ICU protocol (03-12 @ 00:52)  Assess Neurological Status:     Type of Neuro Check:  General    Frequency:  every 4 hours (03-12 @ 00:52)  Notify Provider For:     Additional Instructions:  Decline or change in neurological status (03-12 @ 00:52)  Activity - May Dangle at Bedside:     Time/Priority:  Routine (03-12 @ 00:52)  Activity - Increase As Tolerated:     Time/Priority:  Routine (03-12 @ 00:52)  pantoprazole  Injectable: [Known as PROTONIX  Injectable]  40 milliGRAM(s), IV Push, daily  Provider's Contact #: [phone] (03-12 @ 00:52)  Admit to Inpatient Level of Care:     Service:  ICU    Physician:  Garry (03-12 @ 00:53)  Complete Blood Count + Automated Diff: AM Sched. Collection: 12-Mar-2018 05:00 (03-12 @ 00:54)  Comprehensive Metabolic Panel: AM Sched. Collection: 12-Mar-2018 05:00 (03-12 @ 00:54)  Magnesium, Serum: AM Sched. Collection: 12-Mar-2018 05:00 (03-12 @ 00:54)  Phosphorus Level, Serum: AM Sched. Collection: 12-Mar-2018 05:00 (03-12 @ 00:54)  Activated Partial Thromboplastin Time:  Start Date:12-Mar-2018. AM Sched. Collection:12-Mar-2018 05:00 (03-12 @ 00:54)  Prothrombin Time and INR, Plasma:  Start Date:12-Mar-2018. AM Sched. Collection:12-Mar-2018 05:00 (03-12 @ 00:54)  Suction Endotracheal: <Continuous> (03-12 @ 00:55)  Spontaneous Breathing Trials:   Frequency <Continuous>  Methods: Pressure Support  Additional Instructions:  5/5 (03-12 @ 00:55)  chlorhexidine 0.12% Liquid: [Known as Peridex]  15 milliLiter(s), Swish and Spit, two times a day  Indication: intubation  Administration Instructions: Rinse for 30 seconds then expectorate.  Dispose unused medication in BLACK bin.  Provider's Contact #: [phone] (03-12 @ 00:55)  POCT  Blood Glucose: 01:05 (03-12 @ 01:06)  Blood Gas Profile - Arterial: STAT (03-12 @ 01:39)  Intra Hospital Transport (03-12 @ 03:04)  Nucleated RBC: 03:15 (03-12 @ 03:41)  norepinephrine Infusion: [Ordered as LEVOPHED]  8 milliGRAM(s) in sodium chloride 0.9% 250 milliLiter(s), infuse at 3 mL/Hr  Dose Rate: 0.02 MICROgram(s)/kG/Min  Administration Instructions: Peripheral/Central Line use  Special Instructions: titrate for MAP > or = to 65 as per protocol  Provider's Contact #: [phone] (03-12 @ 03:59)  potassium phosphate IVPB:   30 milliMole(s) in sodium chloride 0.9% 500 milliLiter(s), IV Intermittent, once, infuse over 6 Hour(s), Stop After 1 Doses  Provider's Contact #: [phone] (03-12 @ 05:53)  Blood Gas Profile - Arterial: 08:00 (03-12 @ 07:20)    Assessment & Opinion:  Intracerebral Hemorrhage, non-traumatic.  Coma. Severe Encephalopathy.  Prognosis dim.  Recommendations:  Continue ICU care.  Supportive care. Echocardiogram.  EEG.   DVT prophylaxis as ordered.  Medications:  vital signs maintenance.  Will follow.

## 2018-03-12 NOTE — H&P ADULT - HISTORY OF PRESENT ILLNESS
56 Y/O female w/ no known significant PMHx admitted s/p cardiac arrest. As per sister, pt was out eating w/ significant other when she suddenly became unresponsive. Upon EMS arrival, pt was pulseless, asystolic, ACLS performed and ROSC achieved after 5 min as per ED. Upon admission head CT determined presence of significant large L intracranial hemorrhage with intraventricular extension and herniation.  Neurosurgery (Dr. Sams) who reports given patient extensive hemorrhage, s/p cardiac arrest, unresponsiveness, her prognosis is extremely poor and refusing transfer because there is no neurosurgical intervention. ICU called for further evaluation. 58 Y/O female w/ no known significant PMHx admitted s/p cardiac arrest. As per sister, pt was out eating w/ significant other when she suddenly became unresponsive. Upon EMS arrival, pt was pulseless, asystolic, ACLS performed and ROSC achieved after 5 min as per ED. Upon admission head CT determined presence of significant large L intracranial hemorrhage with intraventricular extension and herniation.  ED reports Neurosurgery (Dr. Sams) called who states that given patient extensive hemorrhage, s/p cardiac arrest, unresponsiveness, her prognosis is extremely poor and refusing transfer because there is no neurosurgical intervention. ICU called for further evaluation.

## 2018-03-12 NOTE — GOALS OF CARE CONVERSATION - PERSONAL ADVANCE DIRECTIVE - WHAT MATTERS MOST
Spoke to RACHAELAUTUMN JOHNSON ( sister), Michael (niece) Bin CRANE ( Boy Friend) and discussed goals of care and advance care planning. Family next of kin Rachael and Michael requested for only comfort measures and withdraw aggressive measures. MOLST Form completed, DNR/DNI, Comfort measures only 3/12/18.  Hospice care explained and accepted.  Hospice evaluation called in.

## 2018-03-12 NOTE — GOALS OF CARE CONVERSATION - PERSONAL ADVANCE DIRECTIVE - CONVERSATION DETAILS
56 Y/O female w/ no known significant PMHx admitted s/p cardiac arrest. As per sister, pt was out eating w/ significant other when she suddenly became unresponsive. Upon EMS arrival, pt was pulseless, asystolic, ACLS performed and ROSC achieved after 5 min as per ED. Upon admission head CT determined presence of significant large L intracranial hemorrhage with intraventricular extension and herniation.  ED reports Neurosurgery (Dr. Sams) called who states that given patient extensive hemorrhage, s/p cardiac arrest, unresponsiveness, her prognosis is extremely poor and refusing transfer because there is no neurosurgical intervention. ICU called for further evaluation.       Spoke to ESCOBAR JOHNSON ( sister), Michael (niece) Bin CRANE ( Boy Friend) and discussed goals of care and advance care planning. Family next of kin Escobar and Michael requested for only comfort measures and withdraw aggressive measures. MOLST Form completed, DNR/DNI, Comfort measures only 3/12/18.  Hospice care explained and accepted.  Hospice evaluation called in.

## 2018-03-12 NOTE — H&P ADULT - PROBLEM SELECTOR PLAN 1
1. Admit to ICU under Dr. Castañeda w/ hospitalist service for medicine.  2. F/U neuro consult  3. repeat CXR, ABG, make vent changes accordingly  4. DVT and GI prophylaxis  5. neuro checks per protocol  6. continue goals of care discussion w/ family

## 2018-03-12 NOTE — PROGRESS NOTE ADULT - SUBJECTIVE AND OBJECTIVE BOX
PALLIATIVE CARE CONSULTATION NOTE            Requested by Name:  Date/ Time:  Reason for referral/ Consultation:    HPI:  58 Y/O female w/ no known significant PMHx admitted s/p cardiac arrest. As per sister, pt was out eating w/ significant other when she suddenly became unresponsive. Upon EMS arrival, pt was pulseless, asystolic, ACLS performed and ROSC achieved after 5 min as per ED. Upon admission head CT determined presence of significant large L intracranial hemorrhage with intraventricular extension and herniation.  ED reports Neurosurgery (Dr. Sams) called who states that given patient extensive hemorrhage, s/p cardiac arrest, unresponsiveness, her prognosis is extremely poor and refusing transfer because there is no neurosurgical intervention. ICU called for further evaluation. (12 Mar 2018 04:03)      PAST MEDICAL & SURGICAL HISTORY:      SOCIAL HISTORY: Admitted from: home [ ] SNF [ ] [place] [ ] [place] [ ]                                                                smoker [ ] past smoker [ ] non smoker[ ]                                                              no alcohol [ ] social alcohol [ ] alcohol [ ]  Surrogate/ HCP/ Guardian: [ ] YES [ ] NO.     NAME / PHONE #:    Significant other/partner:                     Children:                         Islam/Spirituality:    FAMILY HISTORY:      Baseline ADLs (Prior to admission)  Independent:  Moderately [ ] fully [ ]   Dependent: moderately [ ] fully [ ]    ADVANCE DIRECTIVES:  [ ] YES [ ] NO   DNR: YES [ ] NO [  ]  Completed on:                     MOLST: YES [ ] NO [  ]  Completed on:  Living Will: YES [ ] NO [  ]  Completed on:    Allergies    Allergy Status Unknown    Intolerances      MEDICATIONS  (STANDING):  chlorhexidine 0.12% Liquid 15 milliLiter(s) Swish and Spit two times a day  chlorhexidine 4% Liquid 1 Application(s) Topical daily  norepinephrine Infusion 0.02 MICROgram(s)/kG/Min (3 mL/Hr) IV Continuous <Continuous>  pantoprazole  Injectable 40 milliGRAM(s) IV Push daily    MEDICATIONS  (PRN):    OPIATE NAIVE: (Y/N)  I STOP REVIEWED: (Y/N) : (#-------------------)     REVIEW OF SYSTEM:     PAIN : (Y/N) (0-10)  PAIN SITE :                           QUALITY/QUANTITY OF PAIN :             PAIN RADIATING :( Y/N) SEVERITY :            FREQUENCY :  IMPACT ON ADLs :      DYSPNEA: Yes [  ] No [  ] Mild [ ] Moderate [ ] Severe [ ]  NAUSEA/VOMITING: Yes [  ] No [  ]  EXCESSIVE SECRETIONS: YES [  ] NO [  ]  DEPRESSION: Yes [  ] No [  ] Mild [ ]Moderate [ ] Severe [ ]  ANXIETY: Yes [  ] No [  ]  AGITATION: Yes [  ] No [  ]  CONSTIPATION : Yes [  ] No [  ]  DIARRHEA : Yes [  ] No [  ]  FRAILTY SYNDROME: Yes [  ] No [  ]  FAILURE TO THRIVE: Yes [  ] No [  ]  DIBILITY: Yes [  ] No [  ]  OTHER SYMPTOMS :  UNABLE TO OBTAIN DUE TO POOR MENTATION [  ]    PHYSICAL EXAM:  T(F): 97.5 (03-12-18 @ 08:44), Max: 97.5 (03-12-18 @ 08:44)  HR: 88 (03-12-18 @ 13:00) (72 - 138)  BP: 106/78 (03-12-18 @ 12:00) (77/54 - 189/110)  RR: 22 (03-12-18 @ 13:00) (15 - 22)  SpO2: 97% (03-12-18 @ 13:00) (95% - 100%)  Wt(kg): --   WEIGHT :                      BMI:  I&O's Summary    11 Mar 2018 07:01  -  12 Mar 2018 07:00  --------------------------------------------------------  IN: 0 mL / OUT: 2300 mL / NET: -2300 mL      CAPILLARY BLOOD GLUCOSE      POCT Blood Glucose.: 111 mg/dL (12 Mar 2018 01:05)  POCT Blood Glucose.: 173 mg/dL (11 Mar 2018 21:35)      GENERAL: alert: Yes [ ] No [  ]oriented x ______cofused [ ] lethargic [ ] agitated [ ] cachexia [ ] nonverbal [ ] coma [ ]  HEENT: normal [ ] dry mouth [ ] excessive secretions [ ] Bipap [  ] ET tube/trach [ ] Vent [  ]  LUNGS: Comfortable [ ] tachypnea/ labored breathing[ ]   CV :  normal [ ] tachycardia [ ]bradycardia [  ]   GI : normal [ ] distended [ ] tender [ ] no BS [ ]  PEG /NG tube [ ]   : normal [ ] incontinent [ ] oliguria/anuria [ ] biggs [ ]  MSK : normal [ ] weakness [ ] edema [ ] ambulatory [ ] bedbound/ wheelchair bound [ ]   SKIN : normal [ ] pressure ulcer: Yes [  ] No [  ] Stage ______________ rash: Yes [  ] No [  ]    FUNCTIONAL ASSESSMENT:   Karnofsky Performance Score :  Palliative Performance Status Version 2:         %    LABS:                        12.1   6.69  )-----------( 221      ( 12 Mar 2018 03:41 )             35.2     03-12    147<H>  |  122<H>  |  19  ----------------------------<  99  3.7   |  17<L>  |  0.87    Ca    7.9<L>      12 Mar 2018 03:41  Phos  1.1     03-12  Mg     2.2     03-12    TPro  6.4  /  Alb  3.1<L>  /  TBili  0.3  /  DBili  x   /  AST  113<H>  /  ALT  82<H>  /  AlkPhos  76  03-12    PT/INR - ( 12 Mar 2018 03:41 )   PT: 12.2 sec;   INR: 1.12 ratio         PTT - ( 12 Mar 2018 03:41 )  PTT:26.3 sec  CARDIAC MARKERS ( 11 Mar 2018 21:52 )  .035 ng/mL / x     / x     / x     / x          I&O's Detail    11 Mar 2018 07:01  -  12 Mar 2018 07:00  --------------------------------------------------------  IN:  Total IN: 0 mL    OUT:    Indwelling Catheter - Urethral: 1000 mL    Voided: 1300 mL  Total OUT: 2300 mL    Total NET: -2300 mL          ASSESSMENT:   57y Female admitted with INTRA CRANIAL HEMORRHAGE  PULSE ARREST      PROBLEM LIST :  PROBLEM/RECOMMENDATION:   1) PROBLEM  : Goals of care, counseling/ discussion.  RECOMMENDATION : Meet with patient/ family and discussed GOC & Advanced care planning.                                     Palliative care information /counseling provided.                                       Advanced Directives addressed     PROBLEM/ RECOMMENDATION:  2)PROBLEM :Resuscitation/ DNR/ DNI,   RECOMMENDATION: DNR/ DNI    PROBLEM/ RECOMMENDATION :  3)PROBLEM : Medical order for life sustaining treatment  RECOMMENDATION : MOLST Form ( initiated/ completed)    PROBLEM/RECOMMENDATION :  4)PROBLEM: Advanced care planning  RECOMMENDATION : Family meeting on: SPoke to ESCOBAR JOHNSON and discussed goals of care and advance care planning.  MOLST Form completed, DNR/DNI, Comfort measures only.  Hospice care explained and accepted.  Hospice evaluation called in.        PLAN:  REFFERALS:  HOSPICE: YES [  ] NO [  ]                         PALLIATIVE /MEDICAL SOCIAL WORKER AND : YES [  ] NO [  ]                         : YES [  ] NO [  ]                         SPEECH/ SWALLOW: YES [  ] NO [  ]                         PSYHCH CONSULT: YES [ ] NO [ ]                          PAIN MANAGEMENT: YES [  ] NO [  ]                         NUTRITION: YES [  ] NO [  ]                         ETHICS: YES [  ] NO [  ]                         PT/OT: YES [  ] NO [  ]  RECOMENDATIONS:   CONSIDER COMFORT CARE.  DNR/ DNI.  HOSPICE CARE.  SYMPTOM MANAGEMENT WITH HOSPICE CARE.  PAIN MANAGEMENT. PALLIATIVE CARE CONSULTATION NOTE            Requested by Name: Dr. Castañeda  Date/ Time: 3/12/18  Reason for referral/ Consultation: Goals of care conversation.  HPI:  56 Y/O female w/ no known significant PMHx admitted s/p cardiac arrest. As per sister, pt was out eating w/ significant other when she suddenly became unresponsive. Upon EMS arrival, pt was pulseless, asystolic, ACLS performed and ROSC achieved after 5 min as per ED. Upon admission head CT determined presence of significant large L intracranial hemorrhage with intraventricular extension and herniation.  ED reports Neurosurgery (Dr. Sams) called who states that given patient extensive hemorrhage, s/p cardiac arrest, unresponsiveness, her prognosis is extremely poor and refusing transfer because there is no neurosurgical intervention. ICU called for further evaluation.     PAST MEDICAL & SURGICAL HISTORY:    SOCIAL HISTORY: Admitted from: home [x ] SNF [ ] [place] [ ] [place] [ ]                                                                smoker [ ] past smoker [ ] non smoker[ ]                                                              no alcohol [ ] social alcohol [ ] alcohol [ ]  Surrogate/ HCP/ Guardian: [ ] YES [ ] NO.     NAME / PHONE #: Rachael Johnson (sister) [phone]    Significant other/partner:                     Children:                         Mu-ism/Spirituality:    FAMILY HISTORY:  Baseline ADLs (Prior to admission)  Independent:  Moderately [ ] fully [x ]   Dependent: moderately [ ] fully [ ]    ADVANCE DIRECTIVES:  [ ] YES [x ] NO   DNR: YES [x ] NO [  ]  Completed on:                     MOLST: YES [x ] NO [  ]  Completed on: 3/12/18  Living Will: YES [ ] NO [x  ]  Completed on:    Allergies  Allergy Status Unknown  Intolerances    MEDICATIONS  (STANDING):  chlorhexidine 0.12% Liquid 15 milliLiter(s) Swish and Spit two times a day  chlorhexidine 4% Liquid 1 Application(s) Topical daily  norepinephrine Infusion 0.02 MICROgram(s)/kG/Min (3 mL/Hr) IV Continuous <Continuous>  pantoprazole  Injectable 40 milliGRAM(s) IV Push daily    MEDICATIONS  (PRN):    OPIATE NAIVE: (Y/N)  I STOP REVIEWED: (Y/N) : (#-------------------)     REVIEW OF SYSTEM:     PAIN : (Y/N) (0-10)  PAIN SITE :                           QUALITY/QUANTITY OF PAIN :             PAIN RADIATING :( Y/N) SEVERITY :            FREQUENCY :  IMPACT ON ADLs : total care     DYSPNEA: Yes [ x ] No [  ] Mild [ ] Moderate [ ] Severe [ x]  NAUSEA/VOMITING: Yes [  ] No [ x ]  EXCESSIVE SECRETIONS: YES [ x ] NO [  ]  DEPRESSION: Yes [ x ] No [  ] Mild [ ]Moderate [ ] Severe [x ]  ANXIETY: Yes [  ] No [ x ]  AGITATION: Yes [  ] No [ x ]  CONSTIPATION : Yes [  ] No [ x ]  DIARRHEA : Yes [  ] No [ x ]  FRAILTY SYNDROME: Yes [ x ] No [  ]  FAILURE TO THRIVE: Yes [x  ] No [  ]  DEBILITY Yes [x  ] No [  ]  OTHER SYMPTOMS :  UNABLE TO OBTAIN DUE TO POOR MENTATION [ x ]    PHYSICAL EXAM:  T(F): 97.5 (03-12-18 @ 08:44), Max: 97.5 (03-12-18 @ 08:44)  HR: 88 (03-12-18 @ 13:00) (72 - 138)  BP: 106/78 (03-12-18 @ 12:00) (77/54 - 189/110)  RR: 22 (03-12-18 @ 13:00) (15 - 22)  SpO2: 97% (03-12-18 @ 13:00) (95% - 100%)  Wt(kg): --   WEIGHT :                      BMI:  I&O's Summary    11 Mar 2018 07:01  -  12 Mar 2018 07:00  --------------------------------------------------------  IN: 0 mL / OUT: 2300 mL / NET: -2300 mL    CAPILLARY BLOOD GLUCOSE  POCT Blood Glucose.: 111 mg/dL (12 Mar 2018 01:05)  POCT Blood Glucose.: 173 mg/dL (11 Mar 2018 21:35)    GENERAL: alert: Yes [ ] No [ x ]oriented x confused [ ] lethargic [ ] agitated [ ] cachexia [ ] nonverbal [ ] coma [x ]  HEENT: normal [ ] dry mouth [ ] excessive secretions [ ] BiPAP [  ] ET tube/trach [ x] Vent [x  ]  LUNGS: Comfortable [ ] tachypnea/ labored breathing[x ]   CV :  normal [x ] tachycardia [ ]bradycardia [  ]   GI : normal [ ] distended [ ] tender [ ] no BS [ ]  PEG /NG tube [x ]   : normal [ ] incontinent [ ] oliguria/anuria [ ] Chavez [ x]  MSK : normal [ ] weakness [ ] edema [ ] ambulatory [ ] bedbound/ wheelchair bound [x ]   SKIN : normal [x ] pressure ulcer: Yes [  ] No [  ] Stage ______________ rash: Yes [  ] No [  ]    FUNCTIONAL ASSESSMENT:   Karnofsky Performance Score : <20  Palliative Performance Status Version 2:         %    LABS:                        12.1   6.69  )-----------( 221      ( 12 Mar 2018 03:41 )             35.2     03-12    147<H>  |  122<H>  |  19  ----------------------------<  99  3.7   |  17<L>  |  0.87    Ca    7.9<L>      12 Mar 2018 03:41  Phos  1.1     03-12  Mg     2.2     03-12    TPro  6.4  /  Alb  3.1<L>  /  TBili  0.3  /  DBili  x   /  AST  113<H>  /  ALT  82<H>  /  AlkPhos  76  03-12    PT/INR - ( 12 Mar 2018 03:41 )   PT: 12.2 sec;   INR: 1.12 ratio       PTT - ( 12 Mar 2018 03:41 )  PTT:26.3 sec  CARDIAC MARKERS ( 11 Mar 2018 21:52 )  .035 ng/mL / x     / x     / x     / x        I&O's Detail    11 Mar 2018 07:01  -  12 Mar 2018 07:00  --------------------------------------------------------  IN:  Total IN: 0 mL    OUT:    Indwelling Catheter - Urethral: 1000 mL    Voided: 1300 mL  Total OUT: 2300 mL    Total NET: -2300 mL  ASSESSMENT:   57y Female admitted with INTRA CRANIAL HEMORRHAGE  PULSE ARREST    PROBLEM LIST :  PROBLEM/RECOMMENDATION:   1) PROBLEM  : Goals of care, counseling/ discussion.  RECOMMENDATION : Meet with patient/ family and discussed GOC & Advanced care planning.                                     Palliative care information /counseling provided.                                       Advanced Directives addressed     PROBLEM/ RECOMMENDATION:  2)PROBLEM :Resuscitation/ DNR/ DNI,   RECOMMENDATION: DNR/ DNI    PROBLEM/ RECOMMENDATION :  3)PROBLEM : Medical order for life sustaining treatment  RECOMMENDATION : MOLST Form ( initiated/ completed)    PROBLEM/RECOMMENDATION :  4)PROBLEM: Advanced care planning  RECOMMENDATION : Family meeting on: Spoke to RACHAEL JOHNSON ( sister), Michael (niece) Bin CRANE ( Boy Friend) and discussed goals of care and advance care planning. Family next of kin Rachael and Michael requested for only comfort measures and withdraw aggressive measures. MOLST Form completed, DNR/DNI, Comfort measures only 3/12/18.  Hospice care explained and accepted.  Hospice evaluation called in.        PLAN:  REFERRALS      HOSPICE: YES [x  ] NO [  ]                         PALLIATIVE /MEDICAL SOCIAL WORKER AND : YES [ x ] NO [  ]                         : YES [ x ] NO [  ]                         SPEECH/ SWALLOW: YES [  ] NO [  ]                         PSYHCH CONSULT: YES [ ] NO [ ]                          PAIN MANAGEMENT: YES [ x ] NO [  ]                         NUTRITION: YES [  ] NO [  ]                         ETHICS: YES [  ] NO [  ]                         PT/OT: YES [  ] NO [  ]  RECOMMENDATIONS:   CONSIDER COMFORT CARE.  DNR/ DNI.  HOSPICE CARE.  SYMPTOM MANAGEMENT WITH HOSPICE CARE.  PAIN MANAGEMENT.

## 2018-03-12 NOTE — H&P ADULT - ASSESSMENT
58 Y/O female w/ no known PMHx presents s/p cardiac arrest found to have subarachnoid hemorrhage on head CT.

## 2018-03-12 NOTE — H&P ADULT - NSHPPHYSICALEXAM_GEN_ALL_CORE
PHYSICAL EXAM:    GENERAL: NAD, well-groomed, well-developed  HEAD:  Atraumatic, Normocephalic  EYES: no response to light   ENMT: No tonsillar erythema, exudates, or enlargement; Moist mucous membranes  NECK: Supple, No JVD, Normal thyroid  NERVOUS SYSTEM: unresponsive, no gag noted w/ deep suction, pupils 3 w/o response to light reflex, no response to sternal rub, no spontaneous respirations noted  CHEST/LUNG: +breath sounds bilaterally; No rales, rhonchi, wheezing  HEART: Regular rate and rhythm; No murmurs, rubs  ABDOMEN: Soft, Nondistended; Bowel sounds present  EXTREMITIES:  2+ Peripheral Pulses, No clubbing, cyanosis, or edema

## 2018-03-12 NOTE — DISCHARGE NOTE FOR THE EXPIRED PATIENT - HOSPITAL COURSE
56 Y/O female w/ no known significant PMHx admitted s/p cardiac arrest. As per sister, pt was out eating w/ significant other when she suddenly became unresponsive. Upon EMS arrival, pt was pulseless, asystolic, ACLS performed and ROSC achieved after 5 min as per ED. Upon admission head CT determined presence of significant large L intracranial hemorrhage with intraventricular extension and herniation.  ED reports Neurosurgery (Dr. Sams) called who states that given patient extensive hemorrhage, s/p cardiac arrest, unresponsiveness, her prognosis is extremely poor and refusing transfer because there is no neurosurgical intervention. Pt admitted to ICU for further management.  Pt with severe neurologic deficit.  Had long discussion with family re: grim prognosis and they opted for complete comfort measures.  Pt terminally extubated and  shortly after.  Pt pronounced with family at bedside at 1620.

## 2018-03-13 VITALS — TEMPERATURE: 99 F

## 2018-03-15 DIAGNOSIS — I62.9 NONTRAUMATIC INTRACRANIAL HEMORRHAGE, UNSPECIFIED: ICD-10-CM

## 2018-03-15 DIAGNOSIS — I60.6 NONTRAUMATIC SUBARACHNOID HEMORRHAGE FROM OTHER INTRACRANIAL ARTERIES: ICD-10-CM

## 2018-03-15 DIAGNOSIS — I61.5 NONTRAUMATIC INTRACEREBRAL HEMORRHAGE, INTRAVENTRICULAR: ICD-10-CM

## 2018-03-15 DIAGNOSIS — I46.9 CARDIAC ARREST, CAUSE UNSPECIFIED: ICD-10-CM

## 2018-03-15 DIAGNOSIS — G93.40 ENCEPHALOPATHY, UNSPECIFIED: ICD-10-CM

## 2018-03-15 DIAGNOSIS — R40.20 UNSPECIFIED COMA: ICD-10-CM

## 2018-11-01 NOTE — ED ADULT NURSE REASSESSMENT NOTE - NS ED NURSE REASSESS COMMENT FT1
OUTPATIENT PHYSICAL THERAPY DISCHARGE SUMMARY     Name: Shira Vega  Clinic Number: [id number]    Diagnosis:   Encounter Diagnoses   Name Primary?    Impaired functional mobility, balance, and endurance     Fear of falling      Physician: Brennen Macdonald MD  Treatment Orders: PT Eval and Treat  Past Medical History:   Diagnosis Date    Basilar skull fracture 1/27/2015    Bilateral fracture of pubic rami 1/27/2015    Fracture of ribs, multiple, closed 1/27/2015    HTN (hypertension), benign     Hyperlipidemia LDL goal <160     Hypothyroidism     MVP (mitral valve prolapse)        Initial visit: 06/18/18  Date of Last visit: 07/19/18  Date of Discharge Note:  11/01/18  Total Visits Received: 7  Missed Visits: 0  ASSESSMENT   Status Towards Goals Met:  See below.     GOALS:   Short term goals: 4 weeks, pt agrees to goals set.  1. Pt to be (I) with established HEP MET  2. Pt to improve GST condition 2 to at least 10 seconds for improved balance and decreased fall risk MET  3. Pt to improve GST condition 3 to at least 15 seconds for improved balance and decreased fall risk MET  4. Pt to improve GST condition 4 to at least 5 seconds for improved balance and decreased fall risk MET  5. Pt to improve GST condition 6 to at least 10 seconds for improved balance and decreased fall risk MET  6. Pt to improve FGA score to at least 23/30 for improved balance and decreased fall risk MET  7. Pt to improve RLE SLS score to at least 8 seconds for improved safety with standing ADL MET  8. Pt to improve LLE SLS score to at least 3 seconds for improved safety with standing ADL MET     Long term goals: 8 weeks, pt agrees to goals set  9. Pt to improve GST condition 2 to at least 15 seconds for improved balance and decreased fall risk  10. Pt to improve GST condition 3 to at least 20 seconds for improved balance and decreased fall risk  11. Pt to improve GST condition 4 to at least 10 seconds for improved balance and decreased  Family at bedside, as per family, patient does not take any home medications, patient works at [employer] and gets seen there if she is not feeling well, has not been to PCP in years. Patient was eating gelato and reported not feeling well as per family prior to EMS arrival fall risk  12. Pt to improve GST condition 6 to at least 15 seconds for improved balance and decreased fall risk  13. Pt to improve FGA score to at least 26/30 for improved balance and decreased fall risk  14. Pt to improve RLE SLS score to at least 10 seconds for improved safety with standing ADL  15. Pt to improve LLE SLS score to at least 5 seconds for improved safety with standing ADL  16. Pt to improve FOTO score to at least 81% for improved self concept of functional mobility.     Goals Not achieved and why: Pt was making good progress with therapy. However, she had to stop therapy while recovering from cataract surgery. Pt has not called to reschedule any more appointments. Pt will need new MD orders when appropriate to return to therapy.       Discharge reason : Pt has not re-scheduled further follow-up sessions    PLAN   This patient is discharged from [facility].     Viridiana Matthew, PT  11/01/2018

## 2021-02-08 NOTE — PATIENT PROFILE ADULT. - HAS THE PATIENT HAD A SIGNIFICANT CHANGE IN FUNCTIONAL STATUS DUE TO CVA, HEAD TRAUMA, ORTHOPEDIC TRAUMA/SURGERY, OR FALL, WITH THE WEEK PRIOR TO ADMISSION
CC:  Psych F/U    S:    Patient seen and chart reviewed, case discussed with staff.  Patient still going through acute withdrawal and is required 8 mg of Ativan since midnight.  Patient dates that she is still having nausea and vomiting but that her detox is improving especially with the Suboxone.  Patient said her sleep and appetite are also improving but she still has a high level of anxiety rating is an 8 out of 10 she has had a number of panic attacks.  Patient denies any active or passive suicidal homicidal, denies any side effects of the current medication regimen.  We discussed in detail our recommendations as an outpatient for a gradual taper off benzodiazepines which she does not appear motivated to do at this time they will optimize her Remeron and gabapentin to better treat her underlying depression and anxiety.  Discussed with patient that we will see how her withdrawal looks tomorrow with a plan to restart Effexor and titrate up to optimal dosing.  Provided brief supportive psychotherapy focused on psychoeducation.      Current Medications:  Current Facility-Administered Medications   Medication Dose Route Frequency Provider Last Rate Last Admin   • gabapentin (NEURONTIN) capsule 300 mg  300 mg Oral TID Jesus Day DO       • QUEtiapine (SEROquel) tablet 50 mg  50 mg Oral Q8H PRN Jana Cabrera MD       • LORazepam (ATIVAN) tablet 2 mg  2 mg Oral Q1H PRN Bonny Troy NP   2 mg at 02/08/21 0838   • sodium chloride 0.9 % flush bag 25 mL  25 mL Intravenous PRN Salma Romano NP       • Potassium Standard Replacement Protocol   Does not apply See Admin Instructions Salma Romano NP       • Magnesium Standard Replacement Protocol   Does not apply See Admin Instructions Salma Romano NP       • LORazepam (ATIVAN) injection 2 mg  2 mg Intravenous Q1H PRN Bj Shore MD   2 mg at 02/08/21 0544   • clonazePAM (KlonoPIN) tablet 1 mg  1 mg Oral TID Bj Shore MD   1 mg at 02/08/21 0838   •  buprenorphine-naLOXone (SUBOXONE FILM) 8-2 MG sublingual film 1 each  1 each Sublingual 2 times per day Bj Shore MD   1 each at 02/08/21 0837   • mirtazapine (REMERON) tablet 15 mg  15 mg Oral QHS Bj Shore MD   15 mg at 02/07/21 2116   • enoxaparin (LOVENOX) injection 40 mg  40 mg Subcutaneous Q24H Charissa Silver MD   40 mg at 02/07/21 2117   • ondansetron (ZOFRAN ODT) disintegrating tablet 4 mg  4 mg Oral Q6H PRN Charissa Silver MD        Or   • ondansetron (ZOFRAN) injection 4 mg  4 mg Intravenous Q12H PRN Charissa Silver MD   4 mg at 02/07/21 1756   • ibuprofen (MOTRIN) tablet 600 mg  600 mg Oral Q6H PRN Charissa Silver MD       • loperamide (IMODIUM) capsule 2 mg  2 mg Oral Q6H PRN Charissa Silver MD       • acetaminophen (TYLENOL) tablet 650 mg  650 mg Oral Q4H PRN Charissa Silver MD   650 mg at 02/06/21 0431   • sodium chloride 0.9% infusion   Intravenous Continuous Charissa Silver  mL/hr at 02/08/21 0649 Rate Verify at 02/08/21 0649   • melatonin tablet 6 mg  6 mg Oral Nightly PRN Charissa Silver MD   6 mg at 02/07/21 2211   • nicotine (NICODERM) 21 MG/24HR patch 1 patch  1 patch Transdermal Daily Bonny Troy NP   1 patch at 02/08/21 0837       Labs:  No results found for this or any previous visit (from the past 24 hour(s)).    Vitals:  Vitals with min/max:      Vital Last Value 24 Hour Range   Temperature 97 °F (36.1 °C) (02/08/21 0811) Temp  Min: 97 °F (36.1 °C)  Max: 98.9 °F (37.2 °C)   Pulse 68 (02/08/21 0811) Pulse  Min: 64  Max: 84   Respiratory (!) 21 (02/08/21 0811) Resp  Min: 16  Max: 21   Non-Invasive  Blood Pressure (!) 158/108 (02/08/21 0811) BP  Min: 147/96  Max: 165/113   Pulse Oximetry 98 % (02/08/21 0811) SpO2  Min: 95 %  Max: 99 %   Arterial   Blood Pressure   No data recorded          Mental Status Exam:     APPEARANCE: 35 year old female appears stated age, fair grooming/hygiene,       BEHAVIOR:  engaged, cooperative with interview. Fair eye contact.    SPEECH: RRR,  normal volume, normal tone.  Spontaneous speech without stuttering/stammering.    MOTOR: No PMR or PMA noted.  No tics/tremors or other abnormal motor movements visible.    MOOD: Little better    AFFECT: depressed    THOUGHT PROCESS: Linear, logical, goal directed in conversation towards treatment    THOUGHT CONTENT: No active or passive suicidal homicidal ideation today    PERCEPTIONS: Denies AVH, does not appear to be responding to internal stimuli    INSIGHT: Fair    JUDGMENT: Fair    COGNITION: A and Ox3, fair concentration, with intact short and long term memory     Assessment:     Diagnosis:     #1 opiate use disorder withdrawal    #2 benzodiazepine use disorder    #3 major depressive disorder, severe, recurrent    #4 generalized anxiety disorder    #5 cocaine use disorder    Plan:     #1 does not require one-to-one sitter at this time but may benefit from voluntary inpatient psychiatric hospitalization    #2 Klonopin 1 mg p.o. 3 times daily scheduled to prevent benzo withdrawal    #3 Suboxone 8 mg p.o. twice daily recommend continue as a maintenance dose    #4 Remeron 30 mg p.o. nightly    #5  Gabapentin 400 mg p.o. 3 times daily scheduled, informed consent obtained from medications     yes